# Patient Record
Sex: MALE | Race: BLACK OR AFRICAN AMERICAN | Employment: UNEMPLOYED | URBAN - METROPOLITAN AREA
[De-identification: names, ages, dates, MRNs, and addresses within clinical notes are randomized per-mention and may not be internally consistent; named-entity substitution may affect disease eponyms.]

---

## 2023-02-16 ENCOUNTER — APPOINTMENT (OUTPATIENT)
Dept: CT IMAGING | Age: 63
End: 2023-02-16
Attending: EMERGENCY MEDICINE
Payer: MEDICARE

## 2023-02-16 ENCOUNTER — HOSPITAL ENCOUNTER (EMERGENCY)
Age: 63
Discharge: HOME | End: 2023-02-16
Attending: EMERGENCY MEDICINE
Payer: MEDICARE

## 2023-02-16 VITALS
SYSTOLIC BLOOD PRESSURE: 158 MMHG | DIASTOLIC BLOOD PRESSURE: 84 MMHG | RESPIRATION RATE: 15 BRPM | WEIGHT: 172.84 LBS | HEIGHT: 72 IN | BODY MASS INDEX: 23.41 KG/M2 | HEART RATE: 71 BPM | TEMPERATURE: 98.4 F | OXYGEN SATURATION: 98 %

## 2023-02-16 DIAGNOSIS — E87.6 HYPOKALEMIA: Primary | ICD-10-CM

## 2023-02-16 LAB
ALBUMIN SERPL-MCNC: 4 G/DL (ref 3.5–5)
ALBUMIN/GLOB SERPL: 1.4 (ref 1.1–2.2)
ALP SERPL-CCNC: 69 U/L (ref 45–117)
ALT SERPL-CCNC: 12 U/L (ref 12–78)
ANION GAP SERPL CALC-SCNC: 11 MMOL/L (ref 5–15)
APPEARANCE UR: CLEAR
AST SERPL-CCNC: 16 U/L (ref 15–37)
BACTERIA URNS QL MICRO: NEGATIVE /HPF
BASOPHILS # BLD: 0 K/UL (ref 0–0.1)
BASOPHILS NFR BLD: 1 % (ref 0–1)
BILIRUB SERPL-MCNC: 1 MG/DL (ref 0.2–1)
BILIRUB UR QL: NEGATIVE
BUN SERPL-MCNC: 8 MG/DL (ref 6–20)
BUN/CREAT SERPL: 8 (ref 12–20)
CALCIUM SERPL-MCNC: 8.4 MG/DL (ref 8.5–10.1)
CHLORIDE SERPL-SCNC: 101 MMOL/L (ref 97–108)
CO2 SERPL-SCNC: 28 MMOL/L (ref 21–32)
COLOR UR: ABNORMAL
CREAT SERPL-MCNC: 1.01 MG/DL (ref 0.7–1.3)
DIFFERENTIAL METHOD BLD: ABNORMAL
EOSINOPHIL # BLD: 0.2 K/UL (ref 0–0.4)
EOSINOPHIL NFR BLD: 4 % (ref 0–7)
EPITH CASTS URNS QL MICRO: ABNORMAL /LPF
ERYTHROCYTE [DISTWIDTH] IN BLOOD BY AUTOMATED COUNT: 13.5 % (ref 11.5–14.5)
GLOBULIN SER CALC-MCNC: 2.8 G/DL (ref 2–4)
GLUCOSE SERPL-MCNC: 92 MG/DL (ref 65–100)
GLUCOSE UR STRIP.AUTO-MCNC: NEGATIVE MG/DL
HCT VFR BLD AUTO: 30.3 % (ref 36.6–50.3)
HGB BLD-MCNC: 10.5 G/DL (ref 12.1–17)
HGB UR QL STRIP: NEGATIVE
IMM GRANULOCYTES # BLD AUTO: 0 K/UL (ref 0–0.04)
IMM GRANULOCYTES NFR BLD AUTO: 0 % (ref 0–0.5)
KETONES UR QL STRIP.AUTO: NEGATIVE MG/DL
LEUKOCYTE ESTERASE UR QL STRIP.AUTO: NEGATIVE
LYMPHOCYTES # BLD: 1.4 K/UL (ref 0.8–3.5)
LYMPHOCYTES NFR BLD: 36 % (ref 12–49)
MAGNESIUM SERPL-MCNC: 1.6 MG/DL (ref 1.6–2.4)
MCH RBC QN AUTO: 27.7 PG (ref 26–34)
MCHC RBC AUTO-ENTMCNC: 34.7 G/DL (ref 30–36.5)
MCV RBC AUTO: 79.9 FL (ref 80–99)
MONOCYTES # BLD: 0.3 K/UL (ref 0–1)
MONOCYTES NFR BLD: 7 % (ref 5–13)
NEUTS SEG # BLD: 2.1 K/UL (ref 1.8–8)
NEUTS SEG NFR BLD: 52 % (ref 32–75)
NITRITE UR QL STRIP.AUTO: NEGATIVE
NRBC # BLD: 0 K/UL (ref 0–0.01)
NRBC BLD-RTO: 0 PER 100 WBC
PH UR STRIP: 6.5 (ref 5–8)
PLATELET # BLD AUTO: 261 K/UL (ref 150–400)
PMV BLD AUTO: 10.6 FL (ref 8.9–12.9)
POTASSIUM SERPL-SCNC: 2.8 MMOL/L (ref 3.5–5.1)
PROT SERPL-MCNC: 6.8 G/DL (ref 6.4–8.2)
PROT UR STRIP-MCNC: ABNORMAL MG/DL
RBC # BLD AUTO: 3.79 M/UL (ref 4.1–5.7)
RBC #/AREA URNS HPF: ABNORMAL /HPF (ref 0–5)
SODIUM SERPL-SCNC: 140 MMOL/L (ref 136–145)
SP GR UR REFRACTOMETRY: 1.01
UA: UC IF INDICATED,UAUC: ABNORMAL
UROBILINOGEN UR QL STRIP.AUTO: 1 EU/DL (ref 0.2–1)
WBC # BLD AUTO: 4 K/UL (ref 4.1–11.1)
WBC URNS QL MICRO: ABNORMAL /HPF (ref 0–4)

## 2023-02-16 PROCEDURE — 74011250637 HC RX REV CODE- 250/637: Performed by: EMERGENCY MEDICINE

## 2023-02-16 PROCEDURE — 81001 URINALYSIS AUTO W/SCOPE: CPT

## 2023-02-16 PROCEDURE — 70450 CT HEAD/BRAIN W/O DYE: CPT

## 2023-02-16 PROCEDURE — 93005 ELECTROCARDIOGRAM TRACING: CPT

## 2023-02-16 PROCEDURE — 96365 THER/PROPH/DIAG IV INF INIT: CPT

## 2023-02-16 PROCEDURE — 85025 COMPLETE CBC W/AUTO DIFF WBC: CPT

## 2023-02-16 PROCEDURE — 83735 ASSAY OF MAGNESIUM: CPT

## 2023-02-16 PROCEDURE — 99284 EMERGENCY DEPT VISIT MOD MDM: CPT

## 2023-02-16 PROCEDURE — 74011250636 HC RX REV CODE- 250/636: Performed by: EMERGENCY MEDICINE

## 2023-02-16 PROCEDURE — 80053 COMPREHEN METABOLIC PANEL: CPT

## 2023-02-16 RX ORDER — POTASSIUM CHLORIDE 1500 MG/1
20 TABLET, FILM COATED, EXTENDED RELEASE ORAL 2 TIMES DAILY
Qty: 10 TABLET | Refills: 0 | Status: SHIPPED | OUTPATIENT
Start: 2023-02-16 | End: 2023-02-21

## 2023-02-16 RX ORDER — POTASSIUM CHLORIDE 7.45 MG/ML
10 INJECTION INTRAVENOUS
Status: COMPLETED | OUTPATIENT
Start: 2023-02-16 | End: 2023-02-16

## 2023-02-16 RX ADMIN — POTASSIUM CHLORIDE 10 MEQ: 7.46 INJECTION, SOLUTION INTRAVENOUS at 04:54

## 2023-02-16 RX ADMIN — POTASSIUM BICARBONATE 40 MEQ: 782 TABLET, EFFERVESCENT ORAL at 04:54
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
## 2023-02-16 NOTE — ED NOTES
Spoke with Stella Calderon who stated that she is en route to  pt and will be here within half an hour.

## 2023-02-16 NOTE — ED NOTES
Discharge instructions were given to the patient by Rosette Alonso RN  . The patient left the Emergency Department accompanied by caregiver ambulatory, alert and oriented and in no acute distress with 1 prescription. The patient was encouraged to call or return to the ED for worsening issues or problems and was encouraged to schedule a follow up appointment for continuing care. The patient and caregiver verbalized understanding of discharge instructions and prescriptions, all questions were answered. The patient has no further concerns at this time.

## 2023-02-16 NOTE — ED NOTES
Verbal shift change report given to Cierra Rockwell RN   (oncoming nurse) by Farooq Luna RN (offgoing nurse). Report included the following information SBAR, Kardex, ED Summary, [facility] and Recent Results.

## 2023-02-16 NOTE — ED NOTES
Bedside and Verbal shift change report given to [address] [facility] Robina (oncoming nurse) by Abigail Napier RN (offgoing nurse). Report included the following information .

## 2023-02-16 NOTE — ED NOTES
Pt incontinent of bowel, OOB to BR for hygiene care. Incontince brief and paper scrub pants put on pt.

## 2023-02-16 NOTE — ED PROVIDER NOTES
[facility] EMERGENCY DEPT  EMERGENCY DEPARTMENT ENCOUNTER       Pt Name: Brigida Lala  MRN: [id number]  [place] 1960  Date of evaluation: 2/16/2023  Provider: Brenna Tineo MD   PCP: Marcos Bailey MD  Note Started: 5:58 AM 2/16/23     CHIEF COMPLAINT       Chief Complaint   Patient presents with    Seizure     Unwitnessed episode. Hx seizure per EMS, pt does not take medication for seizures        HISTORY OF PRESENT ILLNESS: 1 or more elements      History From: Patient and EMS  HPI Limitations : Other (nonverbal at baseline)     Brigida Lala is a 70 y.o. male who has a history of tbi and is non-verbal at baseline is brought to the ED by EMS after his roommate called 911 because she as concerned the patient may have had a seizure. No seizure activity was witnessed by the roommate. When ems arrived at the scene patient appeared to be alert, no evidence of injury including no tongue biting or incontinence. Patient is nonverbal at baseline but able to communicate via gestures and denied having had a seizure. Reports having larger and looser stools recently, no melena or blood in stool, no steven diarrhea. No vomiting, nausea or abd pain. No recent illness. Has no complaints on arrival and is not sure why he is here. Please see more comprehensive history below under MDM  Nursing Notes were all reviewed in real time as they are made available. Any disagreements addressed in the HPI/MDM. REVIEW OF SYSTEMS      Review of Systems   Constitutional:  Negative for appetite change, chills, fatigue and fever. HENT:  Negative for congestion. Eyes:  Negative for visual disturbance. Respiratory:  Negative for cough and shortness of breath. Cardiovascular:  Negative for chest pain, palpitations and leg swelling. Gastrointestinal:  Positive for diarrhea. Negative for abdominal distention, abdominal pain, blood in stool, nausea and vomiting.    Genitourinary:  Negative for difficulty urinating, dysuria, flank pain, frequency and hematuria. Musculoskeletal:  Negative for back pain. Skin:  Negative for color change. Neurological:  Negative for headaches. Positives and Pertinent negatives as per HPI and MDM. PAST HISTORY     Past Medical History:  History reviewed. No pertinent past medical history. Past Surgical History:  History reviewed. No pertinent surgical history. Family History:  History reviewed. No pertinent family history. Social History:  Social History     Tobacco Use    Smoking status: Unknown       Allergies:  No Known Allergies    CURRENT MEDICATIONS      Previous Medications    No medications on file         PHYSICAL EXAM      ED Triage Vitals [02/16/23 0256]   ED Encounter Vitals Group      BP (!) 151/79      Pulse (Heart Rate) 79      Resp Rate 17      Temp 98.4 °F (36.9 °C)      Temp src       O2 Sat (%) 97 %      Weight 172 lb 13.5 oz      Height 6'        Physical Exam  Vitals and nursing note reviewed. Constitutional:       Appearance: He is not ill-appearing or diaphoretic. HENT:      Head: Atraumatic. Nose: Nose normal.      Mouth/Throat:      Mouth: Mucous membranes are moist.   Eyes:      General: No scleral icterus. Extraocular Movements: Extraocular movements intact. Conjunctiva/sclera: Conjunctivae normal.      Pupils: Pupils are equal, round, and reactive to light. Cardiovascular:      Rate and Rhythm: Normal rate and regular rhythm. Pulses: Normal pulses. Heart sounds: Normal heart sounds. Pulmonary:      Effort: Pulmonary effort is normal.      Breath sounds: Normal breath sounds. Abdominal:      General: Bowel sounds are normal. There is no distension. Palpations: Abdomen is soft. Tenderness: There is no abdominal tenderness. There is no right CVA tenderness or left CVA tenderness. Musculoskeletal:         General: No swelling, tenderness, deformity or signs of injury. Normal range of motion.       Cervical back: Normal range of motion and neck supple. Skin:     General: Skin is warm and dry. Capillary Refill: Capillary refill takes less than 2 seconds. Neurological:      Mental Status: He is alert. Mental status is at baseline. DIAGNOSTIC RESULTS   LABS:     Recent Results (from the past 24 hour(s))   CBC WITH AUTOMATED DIFF    Collection Time: 02/16/23  3:09 AM   Result Value Ref Range    WBC 4.0 (L) 4.1 - 11.1 K/uL    RBC 3.79 (L) 4.10 - 5.70 M/uL    HGB 10.5 (L) 12.1 - 17.0 g/dL    HCT 30.3 (L) 36.6 - 50.3 %    MCV 79.9 (L) 80.0 - 99.0 FL    MCH 27.7 26.0 - 34.0 PG    MCHC 34.7 30.0 - 36.5 g/dL    RDW 13.5 11.5 - 14.5 %    PLATELET 261 895 - 015 K/uL    MPV 10.6 8.9 - 12.9 FL    NRBC 0.0 0  WBC    ABSOLUTE NRBC 0.00 0.00 - 0.01 K/uL    NEUTROPHILS 52 32 - 75 %    LYMPHOCYTES 36 12 - 49 %    MONOCYTES 7 5 - 13 %    EOSINOPHILS 4 0 - 7 %    BASOPHILS 1 0 - 1 %    IMMATURE GRANULOCYTES 0 0.0 - 0.5 %    ABS. NEUTROPHILS 2.1 1.8 - 8.0 K/UL    ABS. LYMPHOCYTES 1.4 0.8 - 3.5 K/UL    ABS. MONOCYTES 0.3 0.0 - 1.0 K/UL    ABS. EOSINOPHILS 0.2 0.0 - 0.4 K/UL    ABS. BASOPHILS 0.0 0.0 - 0.1 K/UL    ABS. IMM. GRANS. 0.0 0.00 - 0.04 K/UL    DF AUTOMATED     METABOLIC PANEL, COMPREHENSIVE    Collection Time: 02/16/23  3:09 AM   Result Value Ref Range    Sodium 140 136 - 145 mmol/L    Potassium 2.8 (L) 3.5 - 5.1 mmol/L    Chloride 101 97 - 108 mmol/L    CO2 28 21 - 32 mmol/L    Anion gap 11 5 - 15 mmol/L    Glucose 92 65 - 100 mg/dL    BUN 8 6 - 20 MG/DL    Creatinine 1.01 0.70 - 1.30 MG/DL    BUN/Creatinine ratio 8 (L) 12 - 20      eGFR >60 >60 ml/min/1.73m2    Calcium 8.4 (L) 8.5 - 10.1 MG/DL    Bilirubin, total 1.0 0.2 - 1.0 MG/DL    ALT (SGPT) 12 12 - 78 U/L    AST (SGOT) 16 15 - 37 U/L    Alk.  phosphatase 69 45 - 117 U/L    Protein, total 6.8 6.4 - 8.2 g/dL    Albumin 4.0 3.5 - 5.0 g/dL    Globulin 2.8 2.0 - 4.0 g/dL    A-G Ratio 1.4 1.1 - 2.2     MAGNESIUM    Collection Time: 02/16/23  3:09 AM   Result Value Ref Range    Magnesium 1.6 1.6 - 2.4 mg/dL   URINALYSIS W/ REFLEX CULTURE    Collection Time: 02/16/23  4:25 AM    Specimen: Urine   Result Value Ref Range    Color YELLOW/STRAW      Appearance CLEAR CLEAR      Specific gravity 1.010      pH (UA) 6.5 5.0 - 8.0      Protein TRACE (A) NEG mg/dL    Glucose Negative NEG mg/dL    Ketone Negative NEG mg/dL    Bilirubin Negative NEG      Blood Negative NEG      Urobilinogen 1.0 0.2 - 1.0 EU/dL    Nitrites Negative NEG      Leukocyte Esterase Negative NEG      WBC 0-4 0 - 4 /hpf    RBC 0-5 0 - 5 /hpf    Epithelial cells FEW FEW /lpf    Bacteria Negative NEG /hpf    UA:UC IF INDICATED CULTURE NOT INDICATED BY UA RESULT CNI     EKG, 12 LEAD, INITIAL    Collection Time: 02/16/23  5:29 AM   Result Value Ref Range    Ventricular Rate 87 BPM    Atrial Rate 87 BPM    P-R Interval 160 ms    QRS Duration 98 ms    Q-T Interval 398 ms    QTC Calculation (Bezet) 478 ms    Calculated P Axis 25 degrees    Calculated R Axis -2 degrees    Calculated T Axis 43 degrees    Diagnosis       Normal sinus rhythm  When compared with ECG of 04-AUG-2009 08:05,  QRS axis shifted left             RADIOLOGY:  Non-plain film images such as CT, Ultrasound and MRI are read by the radiologist. Plain radiographic images are visualized and preliminarily interpreted by the ED Provider with the below findings:       Interpretation per the Radiologist below, if available at the time of this note:     CT HEAD WO CONT   Final Result   No acute intracranial process. Large area of chronic encephalomalacia in the left upper lobe and left frontal   lobes. Imaging findings consistent with severe chronic microvascular ischemic change. There is a moderate degree of cerebral atrophy.                   PROCEDURES       SCREENINGS        CRITICAL CARE TIME       EMERGENCY DEPARTMENT COURSE and DIFFERENTIAL DIAGNOSIS/MDM     Vitals:    Vitals:    02/16/23 0256 02/16/23 0553 02/16/23 0600   BP: (!) 151/79 (!) 150/89 (!) 158/84   Pulse: 79 89 71   Resp: 17 14 15   Temp: 98.4 °F (36.9 °C)     SpO2: 97% 100% 98%   Weight: 78.4 kg (172 lb 13.5 oz)     Height: 6' (1.829 m)         Pertinent Chronic Medical Conditions or Prior Surgeries: tbi, nonverbal at baseline    Social Determinants affecting Dx or Tx: None    Records Reviewed: Nursing notes and EMS run sheet    EKG interpretation: Rhythm: sinus; and regular . Rate (approx.): 87; ST/T wave: nml; Other intervals normal. No new changes concerning for acute ischemia. This and prior available ECGs have been viewed and interpreted by me personally. Ailyn Almanza MD, Msc      CC/HPI Summary, DDx, ED Course, and Reassessment:     Patient presents via ems after roommate called 46 being concerned that patient had a seizure  Per report from ems, it is not sure why roommate was concerned about seizure. No seizure activity was witnessed, patient did not appear post ictal, no evidence of head trauma, tongue biting, urinary incontinence. Patient able to communicate with gestures and answers questions appropriately. Denies having any complaints and states he would like to go home. Only issue has been the more voluminous stools he has experienced but he denies melena, bloody stools or frequent watery diarrhea. Vitals signs are normal in the ED. Physical exam benign. Ddx: possible seizure but at this time there is no evidence of it and patient adamantly denies having had one. Given the history of loose, large stools recently, will get full heme and metabolic panels and ua. Also ct head as history is limited by patient's non-verbal state and he has had a tbi. Ct to ensure no acute bleed. Reassessment: patient tolerating po, hemodynamically stable, feels well. Lab Results notable for hypokalemia. Gi is the likely source of potassium loss based on history.  Normal renal function and ecg.  - given k iv and po in the ed  - prescribed k supplements to take for the next week  - close pmd follow up for recheck in 1-2 days. Rest of labs and ct head are without acute abnormalities. At this time no indication for hospitalization. However, will ensure that patient has a safe ride home, working on contacting roommate. Patient confirms that he has the ability to  prescribed medications and follow up with pmd as instructed. Medical Decision Making  Amount and/or Complexity of Data Reviewed  Labs: ordered. Radiology: ordered. ECG/medicine tests: ordered. Risk  Prescription drug management. Disposition Considerations (Tests not done, Shared Decision Making, Pt Expectation of Test or Tx.):    I have discussed with the patient and/or caregiver my initial clinical impression which is based on an evidence-based clinical evaluation of the patient and interpretation of available results. Involved patient and/or caregiver in management, treatment options and final disposition. Patient and/or caregiver verbalizes understanding and agreement. ED Medications Administered  Medications   potassium bicarb-citric acid (EFFER-K) tablet 40 mEq (40 mEq Oral Given 2/16/23 0454)   potassium chloride 10 mEq in 100 ml IVPB (10 mEq IntraVENous New Bag 2/16/23 0454)       ED Orders Placed:  Orders Placed This Encounter    CT HEAD WO CONT    CBC WITH AUTOMATED DIFF    COMPREHENSIVE METABOLIC PANEL    MAGNESIUM    URINALYSIS W/ REFLEX CULTURE    VITAL SIGNS    EKG 12 LEAD INITIAL    SALINE LOCK IV ONE TIME STAT    potassium bicarb-citric acid (EFFER-K) tablet 40 mEq    potassium chloride 10 mEq in 100 ml IVPB    potassium chloride SR (K-TAB) 20 mEq tablet       FINAL IMPRESSION     1. Hypokalemia          DISPOSITION/PLAN     Progress note:  Patient has been reassessed and reports feeling considerably better, has normal vital signs and feels comfortable going home. I think this is reasonable as no findings today suggest a life-threatening condition.      DISPOSITION: DISCHARGE  The patient's results have been reviewed with patient and available family and/or caregiver. They verbally convey their understanding and agreement of the patient's signs, symptoms, diagnosis, treatment and prognosis and additionally agree to follow up as recommended in the discharge instructions or to return to the Emergency Department should the patient's condition change prior to their follow-up appointment. The patient and available family and/or caregiver verbally agree with the care plan and all of their questions have been answered. The discharge instructions have also been provided to the them with educational information regarding the patient's diagnosis as well a list of reasons why the patient would want to return to the ER prior to their follow-up appointment should any concerns arise, the patient's condition change or symptoms worsen. Venkata Yu MD, Msc    PLAN:  Current Discharge Medication List        START taking these medications    Details   potassium chloride SR (K-TAB) 20 mEq tablet Take 1 Tablet by mouth two (2) times a day for 5 days. Qty: 10 Tablet, Refills: 0  Start date: 2/16/2023, End date: 2/21/2023           2. Follow-up Information       Follow up With Specialties Details Why Contact Info    Ez Vanegas MD General Practice Schedule an appointment as soon as possible for a visit in 2 days  [place]       [facility] [place] EMERGENCY DEPT Emergency Medicine Go to  As needed, If symptoms worsen [address]          3. Return to ED if worse       I am the Primary Clinician of Record. Minerva Jean Baptiste MD (electronically signed)    (Please note that parts of this dictation were completed with voice recognition software. Quite often unanticipated grammatical, syntax, homophones, and other interpretive errors are inadvertently transcribed by the computer software. Please disregards these errors.  Please excuse any errors that have escaped final proofreading.)

## 2023-02-16 NOTE — DISCHARGE INSTRUCTIONS
Your lab work today demonstrated low potassium. Please supplement your diet with the following foods which are high in potassium. In addition, start taking the prescribed potassium supplements. Your potassium must be rechecked and monitored closely, please follow up for this with your primary care doctor in 1-2 days.     Dried fruits (raisins, apricots)  Beans, lentils  Potatoes  Winter squash (acorn, butternut)  Spinach, broccoli  Beet greens  Avocado  Bananas  Cantaloupe  Oranges, orange juice  Coconut water  Tomatoes  Dairy and plant milks (soy, almond)  Yogurt  Cashews, almonds  Chicken  [place]

## 2023-02-16 NOTE — ED NOTES
Spoke with pt's Александр Perez, who states she has POA form and is coming to  pt after she drops kids off at school.

## 2023-02-17 LAB
ATRIAL RATE: 87 BPM
CALCULATED P AXIS, ECG09: 25 DEGREES
CALCULATED R AXIS, ECG10: -2 DEGREES
CALCULATED T AXIS, ECG11: 43 DEGREES
DIAGNOSIS, 93000: NORMAL
P-R INTERVAL, ECG05: 160 MS
Q-T INTERVAL, ECG07: 398 MS
QRS DURATION, ECG06: 98 MS
QTC CALCULATION (BEZET), ECG08: 478 MS
VENTRICULAR RATE, ECG03: 87 BPM